# Patient Record
Sex: FEMALE
[De-identification: names, ages, dates, MRNs, and addresses within clinical notes are randomized per-mention and may not be internally consistent; named-entity substitution may affect disease eponyms.]

---

## 2020-06-04 ENCOUNTER — RX ONLY (RX ONLY)
Age: 45
End: 2020-06-04

## 2021-12-15 ENCOUNTER — APPOINTMENT (OUTPATIENT)
Dept: URBAN - METROPOLITAN AREA CLINIC 232 | Age: 46
Setting detail: DERMATOLOGY
End: 2021-12-15

## 2021-12-15 DIAGNOSIS — B00.1 HERPESVIRAL VESICULAR DERMATITIS: ICD-10-CM

## 2021-12-15 DIAGNOSIS — L82.1 OTHER SEBORRHEIC KERATOSIS: ICD-10-CM

## 2021-12-15 DIAGNOSIS — B36.0 PITYRIASIS VERSICOLOR: ICD-10-CM

## 2021-12-15 PROCEDURE — OTHER REASSURANCE: OTHER

## 2021-12-15 PROCEDURE — OTHER PRESCRIPTION: OTHER

## 2021-12-15 PROCEDURE — OTHER MIPS QUALITY: OTHER

## 2021-12-15 PROCEDURE — 99203 OFFICE O/P NEW LOW 30 MIN: CPT

## 2021-12-15 PROCEDURE — OTHER COUNSELING: OTHER

## 2021-12-15 PROCEDURE — OTHER TREATMENT REGIMEN: OTHER

## 2021-12-15 RX ORDER — VALACYCLOVIR 1 G/1
TABLET, FILM COATED ORAL
Qty: 30 | Refills: 11 | Status: ERX | COMMUNITY
Start: 2021-12-15

## 2021-12-15 RX ORDER — FLUCONAZOLE 150 MG/1
TABLET ORAL
Qty: 4 | Refills: 5 | Status: ERX | COMMUNITY
Start: 2021-12-15

## 2021-12-15 ASSESSMENT — LOCATION DETAILED DESCRIPTION DERM
LOCATION DETAILED: LEFT ANTECUBITAL SKIN
LOCATION DETAILED: RIGHT SUPERIOR VERMILION LIP
LOCATION DETAILED: LEFT SUPERIOR MEDIAL UPPER BACK
LOCATION DETAILED: LEFT ANTERIOR PROXIMAL UPPER ARM

## 2021-12-15 ASSESSMENT — LOCATION ZONE DERM
LOCATION ZONE: TRUNK
LOCATION ZONE: LIP
LOCATION ZONE: ARM

## 2021-12-15 ASSESSMENT — LOCATION SIMPLE DESCRIPTION DERM
LOCATION SIMPLE: LEFT UPPER BACK
LOCATION SIMPLE: RIGHT LIP
LOCATION SIMPLE: LEFT UPPER ARM

## 2021-12-15 NOTE — PROCEDURE: TREATMENT REGIMEN
Plan: When pt was taking 500mg qd, she was still having flares several times a year. At times her cold sores were very large and painful.
Initiate Treatment: Fluconazole 150mg 1 tab q week for 4 weeks then 1 tab monthly
Detail Level: Zone
Initiate Treatment: Valtrex 1 gram daily
Plan: Pt has taken sporanox in the past. Suggested pt change to weekly fluconazole for 4 weeks then 1 tab q month. She may consider not treating for the winter. She reports she has been having hot flashes and seems to worsen her TV.

## 2024-03-21 ENCOUNTER — APPOINTMENT (OUTPATIENT)
Dept: URBAN - METROPOLITAN AREA CLINIC 232 | Age: 49
Setting detail: DERMATOLOGY
End: 2024-03-21

## 2024-03-21 DIAGNOSIS — B36.0 PITYRIASIS VERSICOLOR: ICD-10-CM

## 2024-03-21 DIAGNOSIS — B00.1 HERPESVIRAL VESICULAR DERMATITIS: ICD-10-CM

## 2024-03-21 DIAGNOSIS — L82.1 OTHER SEBORRHEIC KERATOSIS: ICD-10-CM

## 2024-03-21 DIAGNOSIS — L85.8 OTHER SPECIFIED EPIDERMAL THICKENING: ICD-10-CM

## 2024-03-21 PROCEDURE — OTHER COUNSELING: OTHER

## 2024-03-21 PROCEDURE — 99213 OFFICE O/P EST LOW 20 MIN: CPT

## 2024-03-21 PROCEDURE — OTHER TREATMENT REGIMEN: OTHER

## 2024-03-21 PROCEDURE — OTHER MIPS QUALITY: OTHER

## 2024-03-21 PROCEDURE — OTHER PRESCRIPTION: OTHER

## 2024-03-21 RX ORDER — UREA 40 G/100G
CREAM TOPICAL
Qty: 198.4 | Refills: 11 | Status: ERX | COMMUNITY
Start: 2024-03-21

## 2024-03-21 RX ORDER — VALACYCLOVIR 1 G/1
TABLET, FILM COATED ORAL
Qty: 30 | Refills: 11 | Status: ERX | COMMUNITY
Start: 2024-03-21

## 2024-03-21 RX ORDER — FLUCONAZOLE 150 MG/1
TABLET ORAL
Qty: 4 | Refills: 5 | Status: ERX | COMMUNITY
Start: 2024-03-21

## 2024-03-21 ASSESSMENT — LOCATION SIMPLE DESCRIPTION DERM
LOCATION SIMPLE: RIGHT HEEL
LOCATION SIMPLE: LEFT UPPER ARM
LOCATION SIMPLE: RIGHT LIP
LOCATION SIMPLE: LEFT HEEL
LOCATION SIMPLE: LEFT POSTERIOR UPPER ARM
LOCATION SIMPLE: LEFT UPPER BACK

## 2024-03-21 ASSESSMENT — LOCATION DETAILED DESCRIPTION DERM
LOCATION DETAILED: LEFT PROXIMAL POSTERIOR UPPER ARM
LOCATION DETAILED: RIGHT SUPERIOR VERMILION LIP
LOCATION DETAILED: LEFT HEEL
LOCATION DETAILED: RIGHT HEEL
LOCATION DETAILED: LEFT SUPERIOR MEDIAL UPPER BACK
LOCATION DETAILED: LEFT ANTECUBITAL SKIN

## 2024-03-21 ASSESSMENT — LOCATION ZONE DERM
LOCATION ZONE: LIP
LOCATION ZONE: ARM
LOCATION ZONE: TRUNK
LOCATION ZONE: FEET

## 2024-03-21 NOTE — PROCEDURE: TREATMENT REGIMEN
Plan: Pt will use suppressive therapy for the summer and possibly episodic for the winter. She is aware of dosing either way.
Detail Level: Zone
Initiate Treatment: Valtrex 1 gram daily
Initiate Treatment: Fluconazole 150mg  1 tab monthly prn
Plan: Suggested pt use the fluconazole prn. She reports she has been having hot flashes and seems to worsen her TV.
Initiate Treatment: urea 40 % topical cream \\nApply to feet daily at night

## 2024-03-21 NOTE — PROCEDURE: COUNSELING
Detail Level: Simple
Detail Level: Zone
Topical Keratolytics Recommendations: Suggest pt use OTC AmLactin, Goldbond Rough and Bumpy or CeraVe SA cream qd or bid.\\nFor feet, pt may consider using the OTC foot peels and/or foot file on thick areas.

## 2025-04-04 ENCOUNTER — RX ONLY (RX ONLY)
Age: 50
End: 2025-04-04

## 2025-04-04 RX ORDER — VALACYCLOVIR 1 G/1
TABLET, FILM COATED ORAL
Qty: 30 | Refills: 11 | Status: ERX